# Patient Record
Sex: MALE | Race: WHITE | NOT HISPANIC OR LATINO | Employment: FULL TIME | URBAN - METROPOLITAN AREA
[De-identification: names, ages, dates, MRNs, and addresses within clinical notes are randomized per-mention and may not be internally consistent; named-entity substitution may affect disease eponyms.]

---

## 2017-04-11 ENCOUNTER — APPOINTMENT (OUTPATIENT)
Dept: RADIOLOGY | Facility: MEDICAL CENTER | Age: 27
End: 2017-04-11
Attending: EMERGENCY MEDICINE
Payer: COMMERCIAL

## 2017-04-11 ENCOUNTER — OFFICE VISIT (OUTPATIENT)
Dept: URGENT CARE | Facility: CLINIC | Age: 27
End: 2017-04-11
Payer: COMMERCIAL

## 2017-04-11 ENCOUNTER — HOSPITAL ENCOUNTER (EMERGENCY)
Facility: MEDICAL CENTER | Age: 27
End: 2017-04-11
Attending: EMERGENCY MEDICINE
Payer: COMMERCIAL

## 2017-04-11 VITALS
SYSTOLIC BLOOD PRESSURE: 155 MMHG | BODY MASS INDEX: 28.11 KG/M2 | HEIGHT: 69 IN | TEMPERATURE: 97.2 F | RESPIRATION RATE: 16 BRPM | HEART RATE: 68 BPM | WEIGHT: 189.82 LBS | DIASTOLIC BLOOD PRESSURE: 86 MMHG | OXYGEN SATURATION: 84 %

## 2017-04-11 VITALS
TEMPERATURE: 97.9 F | BODY MASS INDEX: 27.2 KG/M2 | HEIGHT: 70 IN | HEART RATE: 78 BPM | DIASTOLIC BLOOD PRESSURE: 88 MMHG | WEIGHT: 190 LBS | SYSTOLIC BLOOD PRESSURE: 124 MMHG | RESPIRATION RATE: 16 BRPM | OXYGEN SATURATION: 97 %

## 2017-04-11 DIAGNOSIS — R19.7 DIARRHEA, UNSPECIFIED TYPE: ICD-10-CM

## 2017-04-11 DIAGNOSIS — E86.0 DEHYDRATION: ICD-10-CM

## 2017-04-11 DIAGNOSIS — R10.13 EPIGASTRIC PAIN: ICD-10-CM

## 2017-04-11 DIAGNOSIS — K29.20 ACUTE ALCOHOLIC GASTRITIS WITHOUT HEMORRHAGE: ICD-10-CM

## 2017-04-11 LAB
ALBUMIN SERPL BCP-MCNC: 4.7 G/DL (ref 3.2–4.9)
ALBUMIN/GLOB SERPL: 1.6 G/DL
ALP SERPL-CCNC: 60 U/L (ref 30–99)
ALT SERPL-CCNC: 223 U/L (ref 2–50)
ANION GAP SERPL CALC-SCNC: 24 MMOL/L (ref 0–11.9)
APPEARANCE UR: CLEAR
APTT PPP: 23.2 SEC (ref 24.7–36)
AST SERPL-CCNC: 63 U/L (ref 12–45)
BASOPHILS # BLD AUTO: 0.3 % (ref 0–1.8)
BASOPHILS # BLD: 0.04 K/UL (ref 0–0.12)
BILIRUB SERPL-MCNC: 1 MG/DL (ref 0.1–1.5)
BILIRUB UR STRIP-MCNC: NORMAL MG/DL
BUN SERPL-MCNC: 14 MG/DL (ref 8–22)
CALCIUM SERPL-MCNC: 9.9 MG/DL (ref 8.5–10.5)
CHLORIDE SERPL-SCNC: 99 MMOL/L (ref 96–112)
CO2 SERPL-SCNC: 10 MMOL/L (ref 20–33)
COLOR UR AUTO: NORMAL
CREAT SERPL-MCNC: 0.78 MG/DL (ref 0.5–1.4)
EOSINOPHIL # BLD AUTO: 0.01 K/UL (ref 0–0.51)
EOSINOPHIL NFR BLD: 0.1 % (ref 0–6.9)
ERYTHROCYTE [DISTWIDTH] IN BLOOD BY AUTOMATED COUNT: 35.9 FL (ref 35.9–50)
GFR SERPL CREATININE-BSD FRML MDRD: >60 ML/MIN/1.73 M 2
GLOBULIN SER CALC-MCNC: 2.9 G/DL (ref 1.9–3.5)
GLUCOSE SERPL-MCNC: 112 MG/DL (ref 65–99)
GLUCOSE UR STRIP.AUTO-MCNC: NORMAL MG/DL
HCT VFR BLD AUTO: 42.7 % (ref 42–52)
HGB BLD-MCNC: 14.4 G/DL (ref 14–18)
IMM GRANULOCYTES # BLD AUTO: 0.08 K/UL (ref 0–0.11)
IMM GRANULOCYTES NFR BLD AUTO: 0.6 % (ref 0–0.9)
INR PPP: 0.91 (ref 0.87–1.13)
KETONES UR STRIP.AUTO-MCNC: NORMAL MG/DL
LEUKOCYTE ESTERASE UR QL STRIP.AUTO: NORMAL
LIPASE SERPL-CCNC: 182 U/L (ref 11–82)
LYMPHOCYTES # BLD AUTO: 0.99 K/UL (ref 1–4.8)
LYMPHOCYTES NFR BLD: 7.2 % (ref 22–41)
MCH RBC QN AUTO: 28.8 PG (ref 27–33)
MCHC RBC AUTO-ENTMCNC: 33.7 G/DL (ref 33.7–35.3)
MCV RBC AUTO: 85.4 FL (ref 81.4–97.8)
MONOCYTES # BLD AUTO: 0.76 K/UL (ref 0–0.85)
MONOCYTES NFR BLD AUTO: 5.5 % (ref 0–13.4)
NEUTROPHILS # BLD AUTO: 11.82 K/UL (ref 1.82–7.42)
NEUTROPHILS NFR BLD: 86.3 % (ref 44–72)
NITRITE UR QL STRIP.AUTO: NORMAL
NRBC # BLD AUTO: 0 K/UL
NRBC BLD AUTO-RTO: 0 /100 WBC
PH UR STRIP.AUTO: 6 [PH] (ref 5–8)
PLATELET # BLD AUTO: 196 K/UL (ref 164–446)
PMV BLD AUTO: 9.4 FL (ref 9–12.9)
POTASSIUM SERPL-SCNC: 4.2 MMOL/L (ref 3.6–5.5)
PROT SERPL-MCNC: 7.6 G/DL (ref 6–8.2)
PROT UR QL STRIP: NORMAL MG/DL
PROTHROMBIN TIME: 12.5 SEC (ref 12–14.6)
RBC # BLD AUTO: 5 M/UL (ref 4.7–6.1)
RBC UR QL AUTO: NORMAL
SODIUM SERPL-SCNC: 133 MMOL/L (ref 135–145)
SP GR UR STRIP.AUTO: 1.02
UROBILINOGEN UR STRIP-MCNC: NORMAL MG/DL
WBC # BLD AUTO: 13.7 K/UL (ref 4.8–10.8)

## 2017-04-11 PROCEDURE — 36415 COLL VENOUS BLD VENIPUNCTURE: CPT

## 2017-04-11 PROCEDURE — 76705 ECHO EXAM OF ABDOMEN: CPT

## 2017-04-11 PROCEDURE — 99203 OFFICE O/P NEW LOW 30 MIN: CPT | Performed by: PHYSICIAN ASSISTANT

## 2017-04-11 PROCEDURE — 85025 COMPLETE CBC W/AUTO DIFF WBC: CPT

## 2017-04-11 PROCEDURE — 81002 URINALYSIS NONAUTO W/O SCOPE: CPT | Performed by: PHYSICIAN ASSISTANT

## 2017-04-11 PROCEDURE — 83690 ASSAY OF LIPASE: CPT

## 2017-04-11 PROCEDURE — 85730 THROMBOPLASTIN TIME PARTIAL: CPT

## 2017-04-11 PROCEDURE — 80053 COMPREHEN METABOLIC PANEL: CPT

## 2017-04-11 PROCEDURE — 700111 HCHG RX REV CODE 636 W/ 250 OVERRIDE (IP): Performed by: EMERGENCY MEDICINE

## 2017-04-11 PROCEDURE — 85610 PROTHROMBIN TIME: CPT

## 2017-04-11 PROCEDURE — 99284 EMERGENCY DEPT VISIT MOD MDM: CPT

## 2017-04-11 PROCEDURE — 700102 HCHG RX REV CODE 250 W/ 637 OVERRIDE(OP): Performed by: EMERGENCY MEDICINE

## 2017-04-11 PROCEDURE — 96375 TX/PRO/DX INJ NEW DRUG ADDON: CPT

## 2017-04-11 PROCEDURE — 700105 HCHG RX REV CODE 258: Performed by: EMERGENCY MEDICINE

## 2017-04-11 PROCEDURE — 96374 THER/PROPH/DIAG INJ IV PUSH: CPT

## 2017-04-11 PROCEDURE — 96361 HYDRATE IV INFUSION ADD-ON: CPT

## 2017-04-11 PROCEDURE — A9270 NON-COVERED ITEM OR SERVICE: HCPCS | Performed by: EMERGENCY MEDICINE

## 2017-04-11 RX ORDER — ONDANSETRON 2 MG/ML
4 INJECTION INTRAMUSCULAR; INTRAVENOUS ONCE
Status: COMPLETED | OUTPATIENT
Start: 2017-04-11 | End: 2017-04-11

## 2017-04-11 RX ORDER — ACETAMINOPHEN 325 MG/1
650 TABLET ORAL EVERY 4 HOURS PRN
COMMUNITY

## 2017-04-11 RX ORDER — SODIUM CHLORIDE 9 MG/ML
1000 INJECTION, SOLUTION INTRAVENOUS ONCE
Status: COMPLETED | OUTPATIENT
Start: 2017-04-11 | End: 2017-04-11

## 2017-04-11 RX ORDER — OMEPRAZOLE 40 MG/1
40 CAPSULE, DELAYED RELEASE ORAL DAILY
Qty: 30 CAP | Refills: 0 | Status: SHIPPED | OUTPATIENT
Start: 2017-04-11

## 2017-04-11 RX ORDER — SUCRALFATE 1 G/1
1 TABLET ORAL
Qty: 120 TAB | Refills: 3 | Status: SHIPPED | OUTPATIENT
Start: 2017-04-11

## 2017-04-11 RX ADMIN — SODIUM CHLORIDE 1000 ML: 9 INJECTION, SOLUTION INTRAVENOUS at 15:00

## 2017-04-11 RX ADMIN — ONDANSETRON 4 MG: 2 INJECTION, SOLUTION INTRAMUSCULAR; INTRAVENOUS at 15:00

## 2017-04-11 RX ADMIN — SODIUM CHLORIDE 1000 ML: 9 INJECTION, SOLUTION INTRAVENOUS at 16:37

## 2017-04-11 RX ADMIN — HYDROMORPHONE HYDROCHLORIDE 1 MG: 1 INJECTION, SOLUTION INTRAMUSCULAR; INTRAVENOUS; SUBCUTANEOUS at 15:00

## 2017-04-11 RX ADMIN — LIDOCAINE HYDROCHLORIDE 30 ML: 20 SOLUTION OROPHARYNGEAL at 17:05

## 2017-04-11 ASSESSMENT — ENCOUNTER SYMPTOMS
VOMITING: 0
DIZZINESS: 0
ABDOMINAL PAIN: 1
ROS GI COMMENTS: 1
PALPITATIONS: 0
CHILLS: 0
FEVER: 0
MYALGIAS: 0
NAUSEA: 0
HEADACHES: 0
DIARRHEA: 1
COUGH: 0

## 2017-04-11 ASSESSMENT — PAIN SCALES - GENERAL: PAINLEVEL_OUTOF10: 8

## 2017-04-11 NOTE — DISCHARGE INSTRUCTIONS
Return immediately to the Emergency Department if you experience continuing or worsening discomfort in your abdomen, fever, chest pain, difficulty breathing, back pain, or any other new or worsening symptoms.       Gastritis, Adult  Gastritis is soreness and swelling (inflammation) of the lining of the stomach. Gastritis can develop as a sudden onset (acute) or long-term (chronic) condition. If gastritis is not treated, it can lead to stomach bleeding and ulcers.  CAUSES   Gastritis occurs when the stomach lining is weak or damaged. Digestive juices from the stomach then inflame the weakened stomach lining. The stomach lining may be weak or damaged due to viral or bacterial infections. One common bacterial infection is the Helicobacter pylori infection. Gastritis can also result from excessive alcohol consumption, taking certain medicines, or having too much acid in the stomach.   SYMPTOMS   In some cases, there are no symptoms. When symptoms are present, they may include:  · Pain or a burning sensation in the upper abdomen.  · Nausea.  · Vomiting.  · An uncomfortable feeling of fullness after eating.  DIAGNOSIS   Your caregiver may suspect you have gastritis based on your symptoms and a physical exam. To determine the cause of your gastritis, your caregiver may perform the following:  · Blood or stool tests to check for the H pylori bacterium.  · Gastroscopy. A thin, flexible tube (endoscope) is passed down the esophagus and into the stomach. The endoscope has a light and camera on the end. Your caregiver uses the endoscope to view the inside of the stomach.  · Taking a tissue sample (biopsy) from the stomach to examine under a microscope.  TREATMENT   Depending on the cause of your gastritis, medicines may be prescribed. If you have a bacterial infection, such as an H pylori infection, antibiotics may be given. If your gastritis is caused by too much acid in the stomach, H2 blockers or antacids may be given. Your  caregiver may recommend that you stop taking aspirin, ibuprofen, or other nonsteroidal anti-inflammatory drugs (NSAIDs).  HOME CARE INSTRUCTIONS  · Only take over-the-counter or prescription medicines as directed by your caregiver.  · If you were given antibiotic medicines, take them as directed. Finish them even if you start to feel better.  · Drink enough fluids to keep your urine clear or pale yellow.  · Avoid foods and drinks that make your symptoms worse, such as:  ¨ Caffeine or alcoholic drinks.  ¨ Chocolate.  ¨ Peppermint or mint flavorings.  ¨ Garlic and onions.  ¨ Spicy foods.  ¨ Citrus fruits, such as oranges, aldo, or limes.  ¨ Tomato-based foods such as sauce, chili, salsa, and pizza.  ¨ Fried and fatty foods.  · Eat small, frequent meals instead of large meals.  SEEK IMMEDIATE MEDICAL CARE IF:   · You have black or dark red stools.  · You vomit blood or material that looks like coffee grounds.  · You are unable to keep fluids down.  · Your abdominal pain gets worse.  · You have a fever.  · You do not feel better after 1 week.  · You have any other questions or concerns.  MAKE SURE YOU:  · Understand these instructions.  · Will watch your condition.  · Will get help right away if you are not doing well or get worse.     This information is not intended to replace advice given to you by your health care provider. Make sure you discuss any questions you have with your health care provider.     Document Released: 12/12/2002 Document Revised: 06/18/2013 Document Reviewed: 01/30/2013  See[employer] Interactive Patient Education ©2016 [employer]

## 2017-04-11 NOTE — ED NOTES
.  Chief Complaint   Patient presents with   • Sent from Urgent Care     ruq pain onset 0700.    • Diarrhea     black tarry stools. irregular last episode 1 weeks ago.      In discomfort at triage. Charge rn notified of pt arrival.

## 2017-04-11 NOTE — ED AVS SNAPSHOT
4/11/2017          Murali Farias  [address] [place] [address]    Dear Murali:    [facility] wants to ensure your discharge home is safe and you or your loved ones have had all your questions answered regarding your care after you leave the hospital.    You may receive a telephone call within two days of your discharge.  This call is to make certain you understand your discharge instructions as well as ensure we provided you with the best care possible during your stay with us.     The call will only last approximately 3-5 minutes and will be done by a nurse.    Once again, we want to ensure your discharge home is safe and that you have a clear understanding of any next steps in your care.  If you have any questions or concerns, please do not hesitate to contact us, we are here for you.  Thank you for choosing [facility] for your healthcare needs.    Sincerely,    Benjamin Corona    [facility]

## 2017-04-11 NOTE — ED AVS SNAPSHOT
Express Fit Access Code: [email or web address]  Expires: 5/11/2017  4:27 PM    Express Fit  A secure, online tool to manage your health information     [employer]’s Express Fit® is a secure, online tool that connects you to your personalized health information from the privacy of your home -- day or night - making it very easy for you to manage your healthcare. Once the activation process is completed, you can even access your medical information using the Express Fit dustin, which is available for free in the Apple Dustin store or Google Play store.     Express Fit provides the following levels of access (as shown below):   My Chart Features   [facility] Primary Care Doctor [facility]  Specialists [facility]  Urgent  Care Non-[facility]  Primary Care  Doctor   Email your healthcare team securely and privately 24/7 X X X X   Manage appointments: schedule your next appointment; view details of past/upcoming appointments X      Request prescription refills. X      View recent personal medical records, including lab and immunizations X X X X   View health record, including health history, allergies, medications X X X X   Read reports about your outpatient visits, procedures, consult and ER notes X X X X   See your discharge summary, which is a recap of your hospital and/or ER visit that includes your diagnosis, lab results, and care plan. X X       How to register for Express Fit:  1. Go to  [email or web address].  2. Click on the Sign Up Now box, which takes you to the New Member Sign Up page. You will need to provide the following information:  a. Enter your Express Fit Access Code exactly as it appears at the top of this page. (You will not need to use this code after you’ve completed the sign-up process. If you do not sign up before the expiration date, you must request a new code.)   b. Enter your date of birth.   c. Enter your home email address.   d. Click Submit, and follow the next screen’s instructions.  3. Create a Express Fit ID. This will be your Express Fit  login ID and cannot be changed, so think of one that is secure and easy to remember.  4. Create a [employer] password. You can change your password at any time.  5. Enter your Password Reset Question and Answer. This can be used at a later time if you forget your password.   6. Enter your e-mail address. This allows you to receive e-mail notifications when new information is available in [employer]ol.  7. Click Sign Up. You can now view your health information.    For assistance activating your [employer] account, call [phone]

## 2017-04-11 NOTE — ED PROVIDER NOTES
"ED Provider Note    CHIEF COMPLAINT  Chief Complaint   Patient presents with   • Sent from Urgent Care     ruq pain onset 0700.    • Diarrhea     black tarry stools. irregular last episode 1 weeks ago.        HPI  Murali Farias is a 26 y.o. male who presents for evaluation of right upper quadrant epigastric abdominal pain that began this morning, sent here from urgent care. He has had some dark stools in the past but none currently. No nausea or vomiting. He does drink alcohol on a daily basis. Pain radiates to his back. He has not had any symptoms like this in the past, no fever. Denies chest pain or shortness of breath but does report taking a deep breath exacerbates his pain in the abdomen. He's been taking all at home for low back pain, denies any other medication use.    REVIEW OF SYSTEMS  Negative for fever, rash, chest pain, dyspnea, vomiting, diarrhea, headache, focal weakness, focal numbness, focal tingling. All other systems are negative.     PAST MEDICAL HISTORY  No past medical history on file.    FAMILY HISTORY  No family history on file.    SOCIAL HISTORY  Social History   Substance Use Topics   • Smoking status: Current Some Day Smoker   • Smokeless tobacco: Not on file   • Alcohol Use: Yes       SURGICAL HISTORY  No past surgical history on file.    CURRENT MEDICATIONS  I personally reviewed the medication list in the charting documentation.     ALLERGIES  No Known Allergies    MEDICAL RECORD  I have reviewed patient's medical record and pertinent results are listed above.      PHYSICAL EXAM  VITAL SIGNS: /86 mmHg  Pulse 82  Temp(Src) 36.2 °C (97.2 °F) (Temporal)  Resp 16  Ht 1.753 m (5' 9.02\")  Wt 86.1 kg (189 lb 13.1 oz)  BMI 28.02 kg/m2  SpO2 97%   Constitutional: Well appearing patient in no acute distress.  Not toxic, nor ill in appearance.  HENT: Mucus membranes moist.    Eyes: No scleral icterus. Normal conjunctiva   Neck: Supple, comfortable, nonpainful range of motion. "   Cardiovascular: Regular heart rate and rhythm.   Thorax & Lungs: Chest is nontender.  Lungs are clear to auscultation with good air movement bilaterally.  No wheeze, rhonchi, nor rales.   Abdomen: Soft, focal tenderness in the epigastrium, no rebound, no guarding   Skin: Warm, dry. No rash appreciated  Extremities/Musculoskeletal: No sign of trauma. No asymmetric calf tenderness, erythema or edema. Normal range of motion   Neurologic: Alert & oriented. No focal deficits observed.   Psychiatric: Normal affect appropriate for the clinical situation.    DIAGNOSTIC STUDIES / PROCEDURES    LABS  Results for orders placed or performed during the hospital encounter of 04/11/17   CBC WITH DIFFERENTIAL   Result Value Ref Range    WBC 13.7 (H) 4.8 - 10.8 K/uL    RBC 5.00 4.70 - 6.10 M/uL    Hemoglobin 14.4 14.0 - 18.0 g/dL    Hematocrit 42.7 42.0 - 52.0 %    MCV 85.4 81.4 - 97.8 fL    MCH 28.8 27.0 - 33.0 pg    MCHC 33.7 33.7 - 35.3 g/dL    RDW 35.9 35.9 - 50.0 fL    Platelet Count 196 164 - 446 K/uL    MPV 9.4 9.0 - 12.9 fL    Neutrophils-Polys 86.30 (H) 44.00 - 72.00 %    Lymphocytes 7.20 (L) 22.00 - 41.00 %    Monocytes 5.50 0.00 - 13.40 %    Eosinophils 0.10 0.00 - 6.90 %    Basophils 0.30 0.00 - 1.80 %    Immature Granulocytes 0.60 0.00 - 0.90 %    Nucleated RBC 0.00 /100 WBC    Neutrophils (Absolute) 11.82 (H) 1.82 - 7.42 K/uL    Lymphs (Absolute) 0.99 (L) 1.00 - 4.80 K/uL    Monos (Absolute) 0.76 0.00 - 0.85 K/uL    Eos (Absolute) 0.01 0.00 - 0.51 K/uL    Baso (Absolute) 0.04 0.00 - 0.12 K/uL    Immature Granulocytes (abs) 0.08 0.00 - 0.11 K/uL    NRBC (Absolute) 0.00 K/uL   COMP METABOLIC PANEL   Result Value Ref Range    Sodium 133 (L) 135 - 145 mmol/L    Potassium 4.2 3.6 - 5.5 mmol/L    Chloride 99 96 - 112 mmol/L    Co2 10 (L) 20 - 33 mmol/L    Anion Gap 24.0 (H) 0.0 - 11.9    Glucose 112 (H) 65 - 99 mg/dL    Bun 14 8 - 22 mg/dL    Creatinine 0.78 0.50 - 1.40 mg/dL    Calcium 9.9 8.5 - 10.5 mg/dL    AST(SGOT) 63  (H) 12 - 45 U/L    ALT(SGPT) 223 (H) 2 - 50 U/L    Alkaline Phosphatase 60 30 - 99 U/L    Total Bilirubin 1.0 0.1 - 1.5 mg/dL    Albumin 4.7 3.2 - 4.9 g/dL    Total Protein 7.6 6.0 - 8.2 g/dL    Globulin 2.9 1.9 - 3.5 g/dL    A-G Ratio 1.6 g/dL   LIPASE   Result Value Ref Range    Lipase 182 (H) 11 - 82 U/L   APTT   Result Value Ref Range    APTT 23.2 (L) 24.7 - 36.0 sec   PROTHROMBIN TIME (INR)   Result Value Ref Range    PT 12.5 12.0 - 14.6 sec    INR 0.91 0.87 - 1.13   ESTIMATED GFR   Result Value Ref Range    GFR If African American >60 >60 mL/min/1.73 m 2    GFR If Non African American >60 >60 mL/min/1.73 m 2         RADIOLOGY  US-GALLBLADDER   Final Result         1. Hepatomegaly and diffuse hepatocellular disease, most commonly hepatic steatosis.            COURSE & MEDICAL DECISION MAKING  I have reviewed any medical record information, laboratory studies and radiographic results as noted above.  Differential diagnoses includes: Pancreatitis, gastritis, PUD, hepatitis, cholecystitis, dehydration, electrolyte abnormalities    Encounter Summary: This is a 26 y.o. male with epigastric right upper quadrant abdominal pain with epigastric tenderness on exam, no vomiting, sent in from urgent care. Says at this time his pain is significantly improved and rates it as a 1 out of 10 which he states is the best it's been all day. He is a daily alcohol drinker. Blood work has resulted with an elevation of lipase but not 3 times higher than the upper limit of normal. His AST and ALT are elevated. His anion gap is significantly elevated. Will administer IV fluids and some pain medication, obtain an ultrasound of the right upper quadrant and then reevaluate ----- ultrasound of the right upper quadrant reveals hepatomegaly and hepatic steatosis, no acute biliary disease, I suspect at this point the patient is experiencing alcoholic gastritis, treated with omeprazole and Carafate, he has been educated on alcohol cessation and  will follow-up with the gastroenterologist, strict return instructions given.    DISPOSITION: Discharge home in stable condition      FINAL IMPRESSION  1. Acute alcoholic gastritis without hemorrhage    2. Epigastric pain    3. Dehydration           This dictation was created using voice recognition software. The accuracy of the dictation is limited to the abilities of the software. I expect there may be some errors of grammar and possibly content. The nursing notes were reviewed and certain aspects of this information were incorporated into this note.    Electronically signed by: Manny Tobin, 4/11/2017 2:15 PM

## 2017-04-11 NOTE — MR AVS SNAPSHOT
"        Murali Farias   2017 12:15 PM   Office Visit   MRN: [id number]    Department:  [facility]   Dept Phone:  [phone]    Description:  Male : 1990   Provider:  Marcial Denton PA-C           Reason for Visit     GI Problem x 1 days/mid abdomen and back pain/diaherra      Allergies as of 2017     No Known Allergies      You were diagnosed with     Epigastric pain   [034241]       Diarrhea, unspecified type   [[id number]]         Vital Signs     Blood Pressure Pulse Temperature Respirations Height Weight    124/88 mmHg 78 36.6 °C (97.9 °F) 16 1.778 m (5' 10\") 86.183 kg (190 lb)    Body Mass Index Oxygen Saturation Smoking Status             27.26 kg/m2 97% Current Some Day Smoker         Basic Information     Date Of Birth Sex Race Ethnicity Preferred Language    1990 Male White Non- English      Health Maintenance        Date Due Completion Dates    IMM HEP B VACCINE (1 of 3 - Primary Series) 1990 ---    IMM HEP A VACCINE (1 of 2 - Standard Series) 10/10/1990 ---    IMM VARICELLA (CHICKENPOX) VACCINE (1 of 2 - 2 Dose Adolescent Series) 10/10/2002 ---    IMM DTaP/Tdap/Td Vaccine (1 - Tdap) 10/10/2008 ---            Results     POCT Urinalysis      Component Value Standard Range & Units    POC Color GOLD Negative    POC Appearance CLEAR Negative    POC Leukocyte Esterase NEG Negative    POC Nitrites NEG Negative    POC Urobiligen NEG Negative (0.2) mg/dL    POC Protein TR Negative mg/dL    POC Urine PH 6.0 5.0 - 8.0    POC Blood NEG Negative    POC Specific Gravity 1.020 <1.005 - >1.030    POC Ketones NEG Negative mg/dL    POC Biliruben NEG Negative mg/dL    POC Glucose NEG Negative mg/dL                        Current Immunizations     No immunizations on file.      Below and/or attached are the medications your provider expects you to take. Review all of your home medications and newly ordered medications with your provider and/or pharmacist. Follow medication " instructions as directed by your provider and/or pharmacist. Please keep your medication list with you and share with your provider. Update the information when medications are discontinued, doses are changed, or new medications (including over-the-counter products) are added; and carry medication information at all times in the event of emergency situations     Allergies:  No Known Allergies          Medications  Valid as of: April 11, 2017 -  1:46 PM    Generic Name Brand Name Tablet Size Instructions for use    .                 Medicines prescribed today were sent to:     [address] [place] [address]    Phone: [phone] Fax: [phone]    Open 24 Hours?: No      Medication refill instructions:       If your prescription bottle indicates you have medication refills left, it is not necessary to call your provider’s office. Please contact your pharmacy and they will refill your medication.    If your prescription bottle indicates you do not have any refills left, you may request refills at any time through one of the following ways: The online [employer] system (except Urgent Care), by calling your provider’s office, or by asking your pharmacy to contact your provider’s office with a refill request. Medication refills are processed only during regular business hours and may not be available until the next business day. Your provider may request additional information or to have a follow-up visit with you prior to refilling your medication.   *Please Note: Medication refills are assigned a new Rx number when refilled electronically. Your pharmacy may indicate that no refills were authorized even though a new prescription for the same medication is available at the pharmacy. Please request the medicine by name with the pharmacy before contacting your provider for a refill.           MyChart Status: Patient Declined        Quit Tobacco  Information     Do you want to quit using tobacco?    Quitting tobacco decreases risks of cancer, heart and lung disease, increases life expectancy, improves sense of taste and smell, and increases spending money, among other benefits.    If you are thinking about quitting, we can help.  • [facility] Quit Tobacco Program: 746.959.6677  o Program occurs weekly for four weeks and includes pharmacist consultation on products to support quitting smoking or chewing tobacco. A provider referral is needed for pharmacist consultation.  • Tobacco Users Help Hotline: 9-070-QUIT-NOW (758-9895) or https://[place].quitlogix.org/  o Free, confidential telephone and online coaching for [place] residents. Sessions are designed on a schedule that is convenient for you. Eligible clients receive free nicotine replacement therapy.  • Nationally: www.smokefree.gov  o Information and professional assistance to support both immediate and long-term needs as you become, and remain, a non-smoker. Smokefree.gov allows you to choose the help that best fits your needs.

## 2017-04-11 NOTE — ED AVS SNAPSHOT
Home Care Instructions                                                                                                                Murali Farias   MRN: [id number]    Department:  [facility], Emergency Dept   Date of Visit:  4/11/2017            [facility], Emergency Dept    [address] [facility] [address]    Phone:  [phone]      You were seen by     Manny Tobin M.D.      Your Diagnosis Was     Acute alcoholic gastritis without hemorrhage     K29.20       These are the medications you received during your hospitalization from 04/11/2017 1331 to 04/11/2017 1736     Date/Time Order Dose Route Action    04/11/2017 1500 NS infusion 1,000 mL 1,000 mL Intravenous New Bag    04/11/2017 1500 HYDROmorphone (DILAUDID) injection 1 mg 1 mg Intravenous Given    04/11/2017 1500 ondansetron (ZOFRAN) syringe/vial injection 4 mg 4 mg Intravenous Given    04/11/2017 1637 NS infusion 1,000 mL 1,000 mL Intravenous New Bag    04/11/2017 1705 hyoscyamine-maalox plus-lidocaine viscous (GI COCKTAIL) oral susp 30 mL 30 mL Oral Given      Follow-up Information     1. Schedule an appointment as soon as possible for a visit with [facility].    Contact information    [place] [address] [phone]          2. Follow up with [facility], Emergency Dept.    Specialty:  Emergency Medicine    Why:  If symptoms worsen, As needed    Contact information    [address] [place] [address]  [place] [address] [phone]      Medication Information     Review all of your home medications and newly ordered medications with your primary doctor and/or pharmacist as soon as possible. Follow medication instructions as directed by your doctor and/or pharmacist.     Please keep your complete medication list with you and share with your physician. Update the information when medications are discontinued, doses are changed, or new medications (including over-the-counter products) are  added; and carry medication information at all times in the event of emergency situations.               Medication List      START taking these medications        Instructions    Morning Afternoon Evening Bedtime    omeprazole 40 MG delayed-release capsule   Commonly known as:  PRILOSEC        Take 1 Cap by mouth every day.   Dose:  40 mg                        sucralfate 1 GM Tabs   Commonly known as:  CARAFATE        Take 1 Tab by mouth 4 Times a Day,Before Meals and at Bedtime.   Dose:  1 g                          ASK your doctor about these medications        Instructions    Morning Afternoon Evening Bedtime    acetaminophen 325 MG Tabs   Commonly known as:  TYLENOL        Take 650 mg by mouth every four hours as needed.   Dose:  650 mg                             Where to Get Your Medications      You can get these medications from any pharmacy     Bring a paper prescription for each of these medications    - omeprazole 40 MG delayed-release capsule  - sucralfate 1 GM Tabs            Procedures and tests performed during your visit     APTT    CBC WITH DIFFERENTIAL    COMP METABOLIC PANEL    ESTIMATED GFR    IV Saline Lock    LIPASE    PROTHROMBIN TIME (INR)    US-GALLBLADDER        Discharge Instructions       Return immediately to the Emergency Department if you experience continuing or worsening discomfort in your abdomen, fever, chest pain, difficulty breathing, back pain, or any other new or worsening symptoms.       Gastritis, Adult  Gastritis is soreness and swelling (inflammation) of the lining of the stomach. Gastritis can develop as a sudden onset (acute) or long-term (chronic) condition. If gastritis is not treated, it can lead to stomach bleeding and ulcers.  CAUSES   Gastritis occurs when the stomach lining is weak or damaged. Digestive juices from the stomach then inflame the weakened stomach lining. The stomach lining may be weak or damaged due to viral or bacterial infections. One common  bacterial infection is the Helicobacter pylori infection. Gastritis can also result from excessive alcohol consumption, taking certain medicines, or having too much acid in the stomach.   SYMPTOMS   In some cases, there are no symptoms. When symptoms are present, they may include:  · Pain or a burning sensation in the upper abdomen.  · Nausea.  · Vomiting.  · An uncomfortable feeling of fullness after eating.  DIAGNOSIS   Your caregiver may suspect you have gastritis based on your symptoms and a physical exam. To determine the cause of your gastritis, your caregiver may perform the following:  · Blood or stool tests to check for the H pylori bacterium.  · Gastroscopy. A thin, flexible tube (endoscope) is passed down the esophagus and into the stomach. The endoscope has a light and camera on the end. Your caregiver uses the endoscope to view the inside of the stomach.  · Taking a tissue sample (biopsy) from the stomach to examine under a microscope.  TREATMENT   Depending on the cause of your gastritis, medicines may be prescribed. If you have a bacterial infection, such as an H pylori infection, antibiotics may be given. If your gastritis is caused by too much acid in the stomach, H2 blockers or antacids may be given. Your caregiver may recommend that you stop taking aspirin, ibuprofen, or other nonsteroidal anti-inflammatory drugs (NSAIDs).  HOME CARE INSTRUCTIONS  · Only take over-the-counter or prescription medicines as directed by your caregiver.  · If you were given antibiotic medicines, take them as directed. Finish them even if you start to feel better.  · Drink enough fluids to keep your urine clear or pale yellow.  · Avoid foods and drinks that make your symptoms worse, such as:  ¨ Caffeine or alcoholic drinks.  ¨ Chocolate.  ¨ Peppermint or mint flavorings.  ¨ Garlic and onions.  ¨ Spicy foods.  ¨ Citrus fruits, such as oranges, aldo, or limes.  ¨ Tomato-based foods such as sauce, chili, salsa, and  pizza.  ¨ Fried and fatty foods.  · Eat small, frequent meals instead of large meals.  SEEK IMMEDIATE MEDICAL CARE IF:   · You have black or dark red stools.  · You vomit blood or material that looks like coffee grounds.  · You are unable to keep fluids down.  · Your abdominal pain gets worse.  · You have a fever.  · You do not feel better after 1 week.  · You have any other questions or concerns.  MAKE SURE YOU:  · Understand these instructions.  · Will watch your condition.  · Will get help right away if you are not doing well or get worse.     This information is not intended to replace advice given to you by your health care provider. Make sure you discuss any questions you have with your health care provider.     Document Released: 12/12/2002 Document Revised: 06/18/2013 Document Reviewed: 01/30/2013  [employer] Interactive Patient Education ©2016 [employer]                Patient Information     Patient Information    Following emergency treatment: all patient requiring follow-up care must return either to a private physician or a clinic if your condition worsens before you are able to obtain further medical attention, please return to the emergency room.     Billing Information    At [facility], we work to make the billing process streamlined for our patients.  Our Representatives are here to answer any questions you may have regarding your hospital bill.  If you have insurance coverage and have supplied your insurance information to us, we will submit a claim to your insurer on your behalf.  Should you have any questions regarding your bill, we can be reached online or by phone as follows:  Online: You are able pay your bills online or live chat with our representatives about any billing questions you may have. We are here to help Monday - Friday from 8:00am to 7:30pm and 9:00am - 12:00pm on Saturdays.  Please visit [email or web address]  for more information.   Phone:   [phone] or [phone]    Please note that your emergency physician, surgeon, pathologist, radiologist, anesthesiologist, and other specialists are not employed by [facility] and will therefore bill separately for their services.  Please contact them directly for any questions concerning their bills at the numbers below:     Emergency Physician Services:  [phone]  [facility]:  [phone]  [facility]:  [phone]  [facility]:  [phone]    1. Your final bill may vary from the amount quoted upon discharge if all procedures are not complete at that time, or if your doctor has additional procedures of which we are not aware. You will receive an additional bill if you return to the Emergency Department at [facility] for suture removal regardless of the facility of which the sutures were placed.     2. Please arrange for settlement of this account at the emergency registration.    3. All self-pay accounts are due in full at the time of treatment.  If you are unable to meet this obligation then payment is expected within 4-5 days.     4. If you have had radiology studies (CT, X-ray, Ultrasound, MRI), you have received a preliminary result during your emergency department visit. Please contact the radiology department [phone] to receive a copy of your final result. Please discuss the Final result with your primary physician or with the follow up physician provided.     Crisis Hotline:  [place] Crisis Hotline:  8-148-JBXXEEZ or 1-719.906.8518  [place] Crisis Hotline:    1-429.617.7409 or 271-889-7985         ED Discharge Follow Up Questions    1. In order to provide you with very good care, we would like to follow up with a phone call in the next few days.  May we have your permission to contact you?     YES /  NO    2. What is the best phone number to call you? (       )_____-__________    3. What is the best time to call you?      Morning  /   Afternoon  /  Evening                   Patient Signature:  ____________________________________________________________    Date:  ____________________________________________________________

## 2017-04-11 NOTE — PROGRESS NOTES
Subjective:      Murali Farias is a 26 y.o. male who presents with GI Problem    Current medications reviewed.  No past medical history on file.  Social History   Substance Use Topics   • Smoking status: Not on file   • Smokeless tobacco: Not on file   • Alcohol Use: Not on file     Family History Reviewed: noncontributory      This am, abdominal RUQ and epigastric with radiation to mid back pain with diarrhea x 2, stools brown this loose watery.  Incidentally he reports black stools x 4 over the past 2-3 weeks.  Pain is sharp cramping and deep ache 4/10 to 10/10.    GI Problem  Associated symptoms include abdominal pain. Pertinent negatives include no chest pain, chills (sweats), coughing, fever, headaches, myalgias, nausea or vomiting.       Review of Systems   Constitutional: Negative for fever and chills (sweats).   Respiratory: Negative for cough.    Cardiovascular: Negative for chest pain and palpitations.   Gastrointestinal: Positive for abdominal pain and diarrhea. Negative for nausea and vomiting.        1   Musculoskeletal: Negative for myalgias and joint pain.   Neurological: Negative for dizziness and headaches.   All other systems reviewed and are negative.         Objective:     There were no vitals taken for this visit.     Physical Exam   Constitutional: He is oriented to person, place, and time. He appears well-developed and well-nourished.   Eyes: EOM are normal. Pupils are equal, round, and reactive to light.   Cardiovascular: Normal rate and normal heart sounds.    Pulmonary/Chest: Effort normal and breath sounds normal.   Abdominal: There is tenderness (severe) in the epigastric area. There is guarding (to mild palpation). There is no CVA tenderness.       Neurological: He is alert and oriented to person, place, and time.   Skin: Skin is warm and dry.   Nursing note and vitals reviewed.              Assessment/Plan:     1. Epigastric pain  POCT Urinalysis   2. Diarrhea, unspecified type       Call  to [facility] main ER, spoke with Dr. Higgins about HPI, patient will be going by private vehicle to ER.  Girlfriend and Patient reports understanding and agrees with plan.

## 2017-04-12 NOTE — ED NOTES
NS infusion completed. Pt given discharge instructions and Rx x 2. Pt verbalized understanding. VSS no acute distress noted, pt able to ambulate without difficulty. Pt home with family